# Patient Record
Sex: FEMALE | Race: BLACK OR AFRICAN AMERICAN | NOT HISPANIC OR LATINO | URBAN - METROPOLITAN AREA
[De-identification: names, ages, dates, MRNs, and addresses within clinical notes are randomized per-mention and may not be internally consistent; named-entity substitution may affect disease eponyms.]

---

## 2020-09-20 ENCOUNTER — EMERGENCY (EMERGENCY)
Facility: HOSPITAL | Age: 40
LOS: 1 days | Discharge: HOME | End: 2020-09-20
Attending: EMERGENCY MEDICINE | Admitting: EMERGENCY MEDICINE
Payer: MEDICARE

## 2020-09-20 VITALS
TEMPERATURE: 99 F | OXYGEN SATURATION: 99 % | DIASTOLIC BLOOD PRESSURE: 72 MMHG | HEART RATE: 101 BPM | SYSTOLIC BLOOD PRESSURE: 113 MMHG | RESPIRATION RATE: 18 BRPM

## 2020-09-20 PROCEDURE — 99282 EMERGENCY DEPT VISIT SF MDM: CPT

## 2020-09-20 RX ORDER — IBUPROFEN 200 MG
400 TABLET ORAL ONCE
Refills: 0 | Status: COMPLETED | OUTPATIENT
Start: 2020-09-20 | End: 2020-09-20

## 2020-09-20 RX ADMIN — Medication 400 MILLIGRAM(S): at 10:03

## 2020-09-20 NOTE — ED PROVIDER NOTE - NSFOLLOWUPINSTRUCTIONS_ED_ALL_ED_FT
Follow up with your primary care doctor in 3 days.    Return to the Emergency Department if you develop visual changes, constant headaches or confusion.
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
## 2020-09-20 NOTE — ED ADULT NURSE NOTE - NSIMPLEMENTINTERV_GEN_ALL_ED
Implemented All Universal Safety Interventions:  [place] to call system. Call bell, personal items and telephone within reach. Instruct patient to call for assistance. Room bathroom lighting operational. Non-slip footwear when patient is off stretcher. Physically safe environment: no spills, clutter or unnecessary equipment. Stretcher in lowest position, wheels locked, appropriate side rails in place.
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
## 2020-09-20 NOTE — ED PROVIDER NOTE - PATIENT PORTAL LINK FT
You can access the FollowMyHealth Patient Portal offered by [facility] by registering at the following website: [email or web address]. By joining [employer]’s FollowMyHealth portal, you will also be able to view your health information using other applications (apps) compatible with our system.

## 2021-06-16 ENCOUNTER — EMERGENCY (EMERGENCY)
Facility: HOSPITAL | Age: 41
LOS: 1 days | Discharge: HOME | End: 2021-06-16
Attending: EMERGENCY MEDICINE | Admitting: EMERGENCY MEDICINE
Payer: MEDICARE

## 2021-06-16 VITALS
HEIGHT: 66 IN | HEART RATE: 75 BPM | DIASTOLIC BLOOD PRESSURE: 81 MMHG | TEMPERATURE: 98 F | RESPIRATION RATE: 18 BRPM | WEIGHT: 173.06 LBS | SYSTOLIC BLOOD PRESSURE: 118 MMHG | OXYGEN SATURATION: 99 %

## 2021-06-16 DIAGNOSIS — Z76.0 ENCOUNTER FOR ISSUE OF REPEAT PRESCRIPTION: ICD-10-CM

## 2021-06-16 DIAGNOSIS — F25.9 SCHIZOAFFECTIVE DISORDER, UNSPECIFIED: ICD-10-CM

## 2021-06-16 PROCEDURE — 99283 EMERGENCY DEPT VISIT LOW MDM: CPT

## 2021-06-16 NOTE — ED PROVIDER NOTE - NSFOLLOWUPINSTRUCTIONS_ED_ALL_ED_FT
Followup with your psychiatrist tomorrow to make sure your medications are accurate and to get a refill. Alternatively, there is a walk in clinic at [facility].  Located on the first floor of the [facility], at [facility] and [address] [place] [address], the Walk-in Evaluation unit provides individuals with mental health assessments to determine what treatment best suits their needs. The unit is open weekdays from 8:30 a.m. to 5 p.m. Individuals requiring service after 5 p.m. or on weekends or holidays can seek assistance at the Psychiatric Emergency Room, located in the Hospital’s Main Building, in Room 1A19. For more information, call [phone] or [phone].

## 2021-06-16 NOTE — ED ADULT TRIAGE NOTE - CHIEF COMPLAINT QUOTE
pt states " I need my medications refill" taking Invega 1.5 mg and Trihexyphen 2 mg . denies SI, visual or auditory hallucination.

## 2021-06-16 NOTE — ED PROVIDER NOTE - OBJECTIVE STATEMENT
history of schizoaffective disorder, here requesting refil of invega and trihexyphenidyl. Reports she ran out about a week ago. No si/hi. Says her doctor was sick so she couldn't get refil but has appointment tomorrow (virtual).

## 2021-06-16 NOTE — ED PROVIDER NOTE - PSYCHIATRIC [+], MLM
PROVIDER:[TOKEN:[[id number]]],PROVIDER:[TOKEN:[[id number]]],PROVIDER:[TOKEN:[[id number]]]
schizoaffective

## 2021-06-16 NOTE — ED PROVIDER NOTE - CLINICAL SUMMARY MEDICAL DECISION MAKING FREE TEXT BOX
requesting med refil of psych meds. initially said she takes 1.5 mg invega and 2mg trihexypehn but doesn't have bottles or written record of medicines. informed those are not normal doses and said maybe it was 6mg. pharmacy contacted and report she has not received these meds on a regular basis from them. last prescription was for 3 days but denied due to wrong dose and because she no longer sees a psychiatrist. will refer to [facility] walk in clinic tomorrow for re-eval or have her see her tele psych as planned. unable to refill at this time.

## 2021-06-16 NOTE — ED PROVIDER NOTE - PATIENT PORTAL LINK FT
You can access the FollowMyHealth Patient Portal offered by [facility] by registering at the following website: [email or web address]. By joining [employer]’s FollowMyHealth portal, you will also be able to view your health information using other applications (apps) compatible with our system.

## 2021-06-17 PROBLEM — Z78.9 OTHER SPECIFIED HEALTH STATUS: Chronic | Status: ACTIVE | Noted: 2020-09-20

## 2021-06-28 PROCEDURE — 99281 EMR DPT VST MAYX REQ PHY/QHP: CPT

## 2021-08-15 ENCOUNTER — EMERGENCY (EMERGENCY)
Facility: HOSPITAL | Age: 41
LOS: 0 days | Discharge: HOME | End: 2021-08-15
Attending: EMERGENCY MEDICINE | Admitting: EMERGENCY MEDICINE
Payer: MEDICARE

## 2021-08-15 VITALS
RESPIRATION RATE: 18 BRPM | HEIGHT: 66 IN | SYSTOLIC BLOOD PRESSURE: 121 MMHG | OXYGEN SATURATION: 100 % | DIASTOLIC BLOOD PRESSURE: 59 MMHG | HEART RATE: 88 BPM | TEMPERATURE: 98 F

## 2021-08-15 DIAGNOSIS — F31.9 BIPOLAR DISORDER, UNSPECIFIED: ICD-10-CM

## 2021-08-15 DIAGNOSIS — F25.9 SCHIZOAFFECTIVE DISORDER, UNSPECIFIED: ICD-10-CM

## 2021-08-15 DIAGNOSIS — Z76.0 ENCOUNTER FOR ISSUE OF REPEAT PRESCRIPTION: ICD-10-CM

## 2021-08-15 PROCEDURE — 99282 EMERGENCY DEPT VISIT SF MDM: CPT

## 2021-08-15 NOTE — ED PROVIDER NOTE - PATIENT PORTAL LINK FT
You can access the FollowMyHealth Patient Portal offered by [facility] by registering at the following website: [email or web address]. By joining [employer]’s FollowMyHealth portal, you will also be able to view your health information using other applications (apps) compatible with our system.

## 2021-08-15 NOTE — ED PROVIDER NOTE - CLINICAL SUMMARY MEDICAL DECISION MAKING FREE TEXT BOX
41 yo F presented to ED for medication refill. Discussed that we are unable to prescribe these medications from the ED but that she can fu in the mental health facility. She understands this. DC home

## 2021-08-15 NOTE — ED PROVIDER NOTE - OBJECTIVE STATEMENT
40F hx bipolar disorder presents for refill of seroquel and trihexylphen, patient states she no longer has a prescription for it and her psychiatrist Dr. Duff is currently on vacation, denies any SI/HI/hallucinations, patient has no other complaints at this time.

## 2021-08-15 NOTE — ED PROVIDER NOTE - NSFOLLOWUPINSTRUCTIONS_ED_ALL_ED_FT
Please follow-up with the outpatient mental health facility to be evaluated for your psychiatric medication refills. If you develop thoughts of harming yourself or others, please return immediately to the ED for further evaluation. Alternatively, there is a walk in clinic at [facility].  Located on the first floor of the [facility], at [facility] and [address] [place] [address], the Walk-in Evaluation unit provides individuals with mental health assessments to determine what treatment best suits their needs. The unit is open weekdays from 8:30 a.m. to 5 p.m. Individuals requiring service after 5 p.m. or on weekends or holidays can seek assistance at the Psychiatric Emergency Room, located in the Hospital’s Main Building, in Room 1A19. For more information, call [phone] or [phone].

## 2021-08-15 NOTE — ED PROVIDER NOTE - NSFOLLOWUPCLINICS_GEN_ALL_ED_FT
[facility]  OP Mental Health  [address] [place] [id number]  Phone: [phone]  Fax:   Follow Up Time: 4-6 Days

## 2021-08-15 NOTE — ED PROVIDER NOTE - PHYSICAL EXAMINATION
Vital Signs: I have reviewed the initial vital signs.  Constitutional: well-nourished, appears stated age, no acute distress.  HEENT: Airway patent, moist MM, no erythema/swelling/deformity of oral structures. EOMI, PERRLA.  CV: regular rate, regular rhythm, well-perfused extremities, 2+ b/l DP and radial pulses equal.  Lungs: BCTA, no increased WOB.  ABD: NTND, no guarding or rebound, no pulsatile mass, no hernias, no flank pain.   MSK: Neck supple, nontender, nl ROM, no stepoff. Chest nontender. Back nontender in TLS spine or to b/l bony structures. Ext nontender, nl rom, no deformity.   INTEG: Skin warm, dry, no rash.  NEURO: A&Ox3, moving all extremities, normal speech  PSYCH: Calm, cooperative, flat affect and interaction.

## 2021-08-15 NOTE — ED PROVIDER NOTE - ATTENDING CONTRIBUTION TO CARE
39 yo F with PMH of schizoaffective disorder presents to Ed for medication refill. Pt wants refills of her Seroquel and trihexyphenidyl. Pt is not having any SOB, CP, nausea, vomiting. No suicidal or homicidal ideations. No visual or auditory hallucinations.     Const: Well nourished, well developed, appears stated age  Eyes: PERRL, no conjunctival injection  CV: RRR, Warm, well-perfused extremities  RESP: CTA B/L, no tachypnea   MSK: No gross deformities appreciated  Skin: Warm, dry. No rashes  Neuro: Alert, CNs II-XII grossly intact. Sensation and motor function of extremities grossly intact.  Psych: flat affect